# Patient Record
Sex: FEMALE | Race: WHITE | NOT HISPANIC OR LATINO | URBAN - METROPOLITAN AREA
[De-identification: names, ages, dates, MRNs, and addresses within clinical notes are randomized per-mention and may not be internally consistent; named-entity substitution may affect disease eponyms.]

---

## 2017-01-17 ENCOUNTER — APPOINTMENT (RX ONLY)
Dept: URBAN - METROPOLITAN AREA CLINIC 310 | Facility: CLINIC | Age: 52
Setting detail: DERMATOLOGY
End: 2017-01-17

## 2017-01-17 DIAGNOSIS — Z85.828 PERSONAL HISTORY OF OTHER MALIGNANT NEOPLASM OF SKIN: ICD-10-CM

## 2017-01-17 DIAGNOSIS — L57.0 ACTINIC KERATOSIS: ICD-10-CM

## 2017-01-17 PROBLEM — L55.1 SUNBURN OF SECOND DEGREE: Status: ACTIVE | Noted: 2017-01-17

## 2017-01-17 PROBLEM — L70.0 ACNE VULGARIS: Status: ACTIVE | Noted: 2017-01-17

## 2017-01-17 PROBLEM — G40.909 EPILEPSY, UNSPECIFIED, NOT INTRACTABLE, WITHOUT STATUS EPILEPTICUS: Status: ACTIVE | Noted: 2017-01-17

## 2017-01-17 PROCEDURE — ? PRESCRIPTION

## 2017-01-17 PROCEDURE — 99213 OFFICE O/P EST LOW 20 MIN: CPT

## 2017-01-17 RX ORDER — INGENOL MEBUTATE 500 UG/G
GEL TOPICAL
Qty: 2 | Refills: 0 | Status: ERX | COMMUNITY
Start: 2017-01-17

## 2017-01-17 RX ORDER — INGENOL MEBUTATE 150 UG/G
GEL TOPICAL
Qty: 1 | Refills: 1 | Status: ERX | COMMUNITY
Start: 2017-01-17

## 2017-01-17 RX ADMIN — INGENOL MEBUTATE: 150 GEL TOPICAL at 00:00

## 2017-01-17 RX ADMIN — INGENOL MEBUTATE: 500 GEL TOPICAL at 00:00

## 2017-01-17 ASSESSMENT — LOCATION ZONE DERM
LOCATION ZONE: LEG
LOCATION ZONE: HAND
LOCATION ZONE: FACE

## 2017-01-17 ASSESSMENT — LOCATION DETAILED DESCRIPTION DERM
LOCATION DETAILED: LEFT ULNAR DORSAL HAND
LOCATION DETAILED: RIGHT PROXIMAL PRETIBIAL REGION
LOCATION DETAILED: RIGHT RADIAL DORSAL HAND
LOCATION DETAILED: LEFT INFERIOR MEDIAL FOREHEAD

## 2017-01-17 ASSESSMENT — LOCATION SIMPLE DESCRIPTION DERM
LOCATION SIMPLE: RIGHT HAND
LOCATION SIMPLE: RIGHT PRETIBIAL REGION
LOCATION SIMPLE: LEFT HAND
LOCATION SIMPLE: LEFT FOREHEAD

## 2018-01-10 ENCOUNTER — APPOINTMENT (RX ONLY)
Dept: URBAN - METROPOLITAN AREA CLINIC 310 | Facility: CLINIC | Age: 53
Setting detail: DERMATOLOGY
End: 2018-01-10

## 2018-01-10 DIAGNOSIS — L57.0 ACTINIC KERATOSIS: ICD-10-CM

## 2018-01-10 DIAGNOSIS — L72.0 EPIDERMAL CYST: ICD-10-CM

## 2018-01-10 PROBLEM — D48.5 NEOPLASM OF UNCERTAIN BEHAVIOR OF SKIN: Status: ACTIVE | Noted: 2018-01-10

## 2018-01-10 PROCEDURE — ? BIOPSY BY SHAVE METHOD AND DESTRUCTION

## 2018-01-10 PROCEDURE — ? COUNSELING

## 2018-01-10 PROCEDURE — 11100: CPT

## 2018-01-10 PROCEDURE — ? PRESCRIPTION

## 2018-01-10 PROCEDURE — 99214 OFFICE O/P EST MOD 30 MIN: CPT | Mod: 25

## 2018-01-10 RX ORDER — FLUOROURACIL 50 MG/G
CREAM TOPICAL
Qty: 1 | Refills: 1 | Status: ERX | COMMUNITY
Start: 2018-01-10

## 2018-01-10 RX ADMIN — FLUOROURACIL: 50 CREAM TOPICAL at 00:00

## 2018-01-10 ASSESSMENT — LOCATION DETAILED DESCRIPTION DERM
LOCATION DETAILED: RIGHT CAVUM CONCHA
LOCATION DETAILED: RIGHT RADIAL DORSAL HAND
LOCATION DETAILED: LEFT ULNAR DORSAL HAND

## 2018-01-10 ASSESSMENT — LOCATION ZONE DERM
LOCATION ZONE: EAR
LOCATION ZONE: HAND

## 2018-01-10 ASSESSMENT — LOCATION SIMPLE DESCRIPTION DERM
LOCATION SIMPLE: RIGHT HAND
LOCATION SIMPLE: LEFT HAND
LOCATION SIMPLE: RIGHT EAR

## 2018-01-10 NOTE — HPI: EVALUATION OF SKIN LESION(S)
Hpi Title: Evaluation of Skin Lesions
Have Your Spot(S) Been Treated In The Past?: has not been treated
Additional History: Prior Hx of Picato once, didn’t like process or results

## 2018-01-13 ENCOUNTER — RX ONLY (OUTPATIENT)
Age: 53
Setting detail: RX ONLY
End: 2018-01-13

## 2018-01-13 RX ORDER — CEPHALEXIN 500 MG/1
TABLET ORAL
Qty: 14 | Refills: 0 | Status: ERX | COMMUNITY
Start: 2018-01-13

## 2019-02-05 ENCOUNTER — APPOINTMENT (RX ONLY)
Dept: URBAN - METROPOLITAN AREA CLINIC 310 | Facility: CLINIC | Age: 54
Setting detail: DERMATOLOGY
End: 2019-02-05

## 2019-02-05 DIAGNOSIS — L57.0 ACTINIC KERATOSIS: ICD-10-CM

## 2019-02-05 PROBLEM — L55.1 SUNBURN OF SECOND DEGREE: Status: ACTIVE | Noted: 2019-02-05

## 2019-02-05 PROBLEM — G40.909 EPILEPSY, UNSPECIFIED, NOT INTRACTABLE, WITHOUT STATUS EPILEPTICUS: Status: ACTIVE | Noted: 2019-02-05

## 2019-02-05 PROBLEM — Z85.828 PERSONAL HISTORY OF OTHER MALIGNANT NEOPLASM OF SKIN: Status: ACTIVE | Noted: 2019-02-05

## 2019-02-05 PROBLEM — L70.0 ACNE VULGARIS: Status: ACTIVE | Noted: 2019-02-05

## 2019-02-05 PROCEDURE — 99213 OFFICE O/P EST LOW 20 MIN: CPT

## 2019-02-05 PROCEDURE — ? PRESCRIPTION

## 2019-02-05 RX ORDER — INGENOL MEBUTATE 500 UG/G
GEL TOPICAL
Qty: 2 | Refills: 0 | Status: ERX | COMMUNITY
Start: 2019-02-05

## 2019-02-05 RX ADMIN — INGENOL MEBUTATE: 500 GEL TOPICAL at 00:00

## 2019-02-05 ASSESSMENT — LOCATION DETAILED DESCRIPTION DERM
LOCATION DETAILED: RIGHT RADIAL DORSAL HAND
LOCATION DETAILED: LEFT RADIAL DORSAL HAND

## 2019-02-05 ASSESSMENT — LOCATION SIMPLE DESCRIPTION DERM
LOCATION SIMPLE: RIGHT HAND
LOCATION SIMPLE: LEFT HAND

## 2019-02-05 ASSESSMENT — LOCATION ZONE DERM: LOCATION ZONE: HAND

## 2020-02-03 ENCOUNTER — APPOINTMENT (RX ONLY)
Dept: URBAN - METROPOLITAN AREA CLINIC 310 | Facility: CLINIC | Age: 55
Setting detail: DERMATOLOGY
End: 2020-02-03

## 2020-02-03 DIAGNOSIS — L57.0 ACTINIC KERATOSIS: ICD-10-CM

## 2020-02-03 DIAGNOSIS — D485 NEOPLASM OF UNCERTAIN BEHAVIOR OF SKIN: ICD-10-CM

## 2020-02-03 PROBLEM — D23.61 OTHER BENIGN NEOPLASM OF SKIN OF RIGHT UPPER LIMB, INCLUDING SHOULDER: Status: ACTIVE | Noted: 2020-02-03

## 2020-02-03 PROBLEM — D48.5 NEOPLASM OF UNCERTAIN BEHAVIOR OF SKIN: Status: ACTIVE | Noted: 2020-02-03

## 2020-02-03 PROCEDURE — 99213 OFFICE O/P EST LOW 20 MIN: CPT | Mod: 25

## 2020-02-03 PROCEDURE — ? LIQUID NITROGEN (COSMETIC)

## 2020-02-03 PROCEDURE — 11102 TANGNTL BX SKIN SINGLE LES: CPT

## 2020-02-03 PROCEDURE — ? ORDER FOR PHOTODYNAMIC THERAPY

## 2020-02-03 PROCEDURE — ? BIOPSY BY SHAVE METHOD

## 2020-02-03 ASSESSMENT — LOCATION SIMPLE DESCRIPTION DERM
LOCATION SIMPLE: RIGHT PRETIBIAL REGION
LOCATION SIMPLE: LEFT FOREHEAD

## 2020-02-03 ASSESSMENT — LOCATION DETAILED DESCRIPTION DERM
LOCATION DETAILED: LEFT SUPERIOR MEDIAL FOREHEAD
LOCATION DETAILED: RIGHT DISTAL PRETIBIAL REGION

## 2020-02-03 ASSESSMENT — LOCATION ZONE DERM
LOCATION ZONE: FACE
LOCATION ZONE: LEG

## 2020-02-03 NOTE — PROCEDURE: ORDER FOR PHOTODYNAMIC THERAPY
Photosensitizer: Levulan
Scalp Incubation Time: 2 Hours
Consent: The procedure and risks were reviewed with the patient including but not limited to: burning, pigmentary changes, pain, blistering, scabbing, redness, and the possibility of needing numerous treatments. Strict photoprotection after the procedure was also discussed.
Face And Neck Incubation Time: 1 Hour
Face And Scalp Incubation Time: 1 Hour for the face and 2 Hours for the scalp
Frequency Of Pdt: Single Treatment
Debridement: No
Face Incubation Time: 1 1/2 Hour
Detail Level: Simple
Pdt Type: MINI-U
Location: Face

## 2020-04-10 ENCOUNTER — APPOINTMENT (RX ONLY)
Dept: URBAN - METROPOLITAN AREA CLINIC 310 | Facility: CLINIC | Age: 55
Setting detail: DERMATOLOGY
End: 2020-04-10

## 2020-04-10 PROBLEM — C44.92 SQUAMOUS CELL CARCINOMA OF SKIN, UNSPECIFIED: Status: ACTIVE | Noted: 2020-04-10

## 2020-04-10 PROCEDURE — 99213 OFFICE O/P EST LOW 20 MIN: CPT | Mod: 95

## 2020-04-10 PROCEDURE — ? COUNSELING

## 2020-04-10 NOTE — PROCEDURE: COUNSELING
Detail Level: Detailed
Patient Specific Counseling (Will Not Stick From Patient To Patient): to schedule appt for removal next week. she is to call me if she does not hear from scheduling by the end of the day.

## 2021-05-17 ENCOUNTER — APPOINTMENT (RX ONLY)
Dept: URBAN - METROPOLITAN AREA CLINIC 308 | Facility: CLINIC | Age: 56
Setting detail: DERMATOLOGY
End: 2021-05-17

## 2021-05-17 DIAGNOSIS — D22 MELANOCYTIC NEVI: ICD-10-CM

## 2021-05-17 DIAGNOSIS — L81.4 OTHER MELANIN HYPERPIGMENTATION: ICD-10-CM

## 2021-05-17 DIAGNOSIS — L73.8 OTHER SPECIFIED FOLLICULAR DISORDERS: ICD-10-CM

## 2021-05-17 DIAGNOSIS — L82.1 OTHER SEBORRHEIC KERATOSIS: ICD-10-CM

## 2021-05-17 DIAGNOSIS — Z85.828 PERSONAL HISTORY OF OTHER MALIGNANT NEOPLASM OF SKIN: ICD-10-CM

## 2021-05-17 PROBLEM — D22.5 MELANOCYTIC NEVI OF TRUNK: Status: ACTIVE | Noted: 2021-05-17

## 2021-05-17 PROCEDURE — ? COUNSELING

## 2021-05-17 PROCEDURE — ? TREATMENT REGIMEN

## 2021-05-17 PROCEDURE — ? FULL BODY SKIN EXAM

## 2021-05-17 PROCEDURE — 99213 OFFICE O/P EST LOW 20 MIN: CPT

## 2021-05-17 PROCEDURE — ? ADDITIONAL NOTES

## 2021-05-17 ASSESSMENT — LOCATION ZONE DERM
LOCATION ZONE: ARM
LOCATION ZONE: TRUNK
LOCATION ZONE: LEG
LOCATION ZONE: FACE

## 2021-05-17 ASSESSMENT — LOCATION DETAILED DESCRIPTION DERM
LOCATION DETAILED: RIGHT MID-UPPER BACK
LOCATION DETAILED: RIGHT MEDIAL MALAR CHEEK
LOCATION DETAILED: LEFT INFERIOR UPPER BACK
LOCATION DETAILED: RIGHT DISTAL PRETIBIAL REGION
LOCATION DETAILED: RIGHT ANTERIOR PROXIMAL THIGH
LOCATION DETAILED: LEFT VENTRAL DISTAL FOREARM

## 2021-05-17 ASSESSMENT — LOCATION SIMPLE DESCRIPTION DERM
LOCATION SIMPLE: RIGHT THIGH
LOCATION SIMPLE: RIGHT CHEEK
LOCATION SIMPLE: RIGHT PRETIBIAL REGION
LOCATION SIMPLE: LEFT FOREARM
LOCATION SIMPLE: RIGHT UPPER BACK
LOCATION SIMPLE: LEFT UPPER BACK

## 2021-05-17 NOTE — HPI: EVALUATION OF SKIN LESION(S)
What Type Of Note Output Would You Prefer (Optional)?: Standard Output
Hpi Title: Evaluation of Skin Lesions
How Severe Are Your Spot(S)?: mild
Have Your Spot(S) Been Treated In The Past?: has not been treated
Additional History: Pt here for a full body exam hx scc.